# Patient Record
Sex: FEMALE | Race: WHITE | Employment: STUDENT | URBAN - METROPOLITAN AREA
[De-identification: names, ages, dates, MRNs, and addresses within clinical notes are randomized per-mention and may not be internally consistent; named-entity substitution may affect disease eponyms.]

---

## 2018-01-11 ENCOUNTER — OFFICE VISIT (OUTPATIENT)
Dept: OTOLARYNGOLOGY | Facility: CLINIC | Age: 18
End: 2018-01-11
Payer: COMMERCIAL

## 2018-01-11 VITALS — TEMPERATURE: 98.3 F | WEIGHT: 125 LBS | HEIGHT: 66 IN | BODY MASS INDEX: 20.09 KG/M2

## 2018-01-11 DIAGNOSIS — J01.90 ACUTE NON-RECURRENT SINUSITIS, UNSPECIFIED LOCATION: Primary | ICD-10-CM

## 2018-01-11 PROCEDURE — 99203 OFFICE O/P NEW LOW 30 MIN: CPT | Performed by: OTOLARYNGOLOGY

## 2018-01-11 NOTE — PATIENT INSTRUCTIONS
General Scheduling Information  To schedule your CT/MRI scan, please contact [facility] at [phone] OR [facility] at [phone]    To schedule your Surgery, please contact our Specialty Schedulers at [phone]      ENT Clinic Locations Clinic Hours Telephone Number     [facility]  [address] [facility] [facility]  [place] [address]     2nd & 4th Thursday:           8:00am - 12:00pm   To schedule/reschedule an appointment with   Dr. Green,   please contact our   Specialty Scheduling Department at:     [phone]       [facility] [place]  [address] [place] [address] [place] [address]   Monday:             8:00am -- 4:30 pm      1st, 3rd & 5th Thursday:           8:00am - 12:00pm      [facility] [address] [address]   Wednesday:       9:00 -- 4:30 pm

## 2018-01-11 NOTE — PROGRESS NOTES
ENT Consultation      History of Present Illness - Elizabeth Bingham is a 17 year old female with history of sinus infections.  Patient had a sinus infection 3 weeks ago. Currently experiencing a plugged right ear for about 1 week. Does have frequent sore throats, but no strep.    Does not usually have discomfort in the posterior of the throat.   Rarely has ear infections (maybe once before).    Past Medical History - No past medical history on file.    Current Medications -   Current Outpatient Prescriptions:      NO ACTIVE MEDICATIONS, , Disp: , Rfl:     Allergies - No Known Allergies    Social History -   Social History     Social History     Marital status: Single     Spouse name: N/A     Number of children: N/A     Years of education: N/A     Social History Main Topics     Smoking status: Not on file     Smokeless tobacco: Not on file     Alcohol use Not on file     Drug use: Not on file     Sexual activity: Not on file     Other Topics Concern     Not on file     Social History Narrative     No narrative on file       Family History - No family history on file.    Review of Systems - As per HPI and PMHx, otherwise review of system review of the head and neck negative.    This document serves as a record of the services and decisions personally performed and made by Kyle Green MD. It was created on his behalf by Kristopher Ruvalcaba, a trained medical scribe. The creation of this document is based the provider's statements to the medical scribe.  Kristopher Ruvalcaba January 11, 2018 2:53 PM     Physical Exam  There were no vitals taken for this visit.  BMI: There is no height or weight on file to calculate BMI.    General - The patient is well nourished and well developed, and appears to have good nutritional status.  Alert and oriented to person and place, answers questions and cooperates with examination appropriately.    SKIN - No suspicious lesions or rashes.  Respiration - No respiratory distress.  Head  and Face - Normocephalic and atraumatic, with no gross asymmetry noted of the contour of the facial features.  The facial nerve is intact, with strong symmetric movements.    Voice and Breathing - The patient was breathing comfortably without the use of accessory muscles. There was no wheezing, stridor, or stertor.  The patients voice was clear and strong, and had appropriate pitch and quality.    Ears - Bilateral pinna and EACs with normal appearing overlying skin. Tympanic membrane intact with good mobility on pneumatic otoscopy bilaterally. Bony landmarks of the ossicular chain are normal. The tympanic membranes are normal in appearance. No retraction, perforation, or masses.  No fluid or purulence was seen in the external canal or the middle ear. Clear on the left.  Serous fluid behind the eardrum on the right.     Eyes - Extraocular movements intact.  Sclera were not icteric or injected, conjunctiva were pink and moist.    Mouth - Examination of the oral cavity showed pink, healthy oral mucosa. No lesions or ulcerations noted.  The tongue was mobile and midline, and the dentition were in good condition.      Throat - The walls of the oropharynx were smooth, pink, moist, symmetric, and had no lesions or ulcerations.  The tonsillar pillars and soft palate were symmetric.  The uvula was midline on elevation. 2 + Tonsils, mild inflammation of the posterior pharynx     Neck - Normal midline excursion of the laryngotracheal complex during swallowing.  Full range of motion on passive movement.  Palpation of the occipital, submental, submandibular, internal jugular chain, and supraclavicular nodes did not demonstrate any abnormal lymph nodes or masses.  The carotid pulse was palpable bilaterally.  Palpation of the thyroid was soft and smooth, with no nodules or goiter appreciated.  The trachea was mobile and midline.    Nose - External contour is symmetric, no gross deflection or scars.  Nasal mucosa is pink and moist  with no abnormal mucus.  The septum was midline and non-obstructive, turbinates of normal size and position.  No polyps, masses, or purulence noted on examination. Slightly enlarged turbinates.     Neuro - Nonfocal neuro exam is normal, CN 2 through 12 intact, normal gait and muscle tone.    Performed in clinic today:  No procedures preformed in clinic today    A/P - Elizabeth Bingham is a 17 year old female with acute otitis media and rhinitis.    3 days of Afrin 2 sprays twice daily. Follow up with nasal saline.  Begin antibiotics Augmentin 875 for 10 days.    Patient will follow up with an ENT near her school in [facility].     Kyle Green MD    The information in this document, created by the medical scribe for me, accurately reflects the services I personally performed and the decisions made by me. I have reviewed and approved this document for accuracy prior to leaving the patient care area.  Kyle Green MD  2:53 PM, 01/11/18

## 2018-01-11 NOTE — LETTER
1/11/2018         RE: Elizabeth Bingham  [address] [facility] [address]        Dear Colleague,    Thank you for referring your patient, Elizabeth Bingham, to the [facility]. Please see a copy of my visit note below.    ENT Consultation      History of Present Illness - Elizabeth Bingham is a 17 year old female with history of sinus infections.  Patient had a sinus infection 3 weeks ago. Currently experiencing a plugged right ear for about 1 week. Does have frequent sore throats, but no strep.    Does not usually have discomfort in the posterior of the throat.   Rarely has ear infections (maybe once before).    Past Medical History - No past medical history on file.    Current Medications -   Current Outpatient Prescriptions:      NO ACTIVE MEDICATIONS, , Disp: , Rfl:     Allergies - No Known Allergies    Social History -   Social History     Social History     Marital status: Single     Spouse name: N/A     Number of children: N/A     Years of education: N/A     Social History Main Topics     Smoking status: Not on file     Smokeless tobacco: Not on file     Alcohol use Not on file     Drug use: Not on file     Sexual activity: Not on file     Other Topics Concern     Not on file     Social History Narrative     No narrative on file       Family History - No family history on file.    Review of Systems - As per HPI and PMHx, otherwise review of system review of the head and neck negative.    This document serves as a record of the services and decisions personally performed and made by Kyle Green MD. It was created on his behalf by Kristopher Ruvalcaba, a trained medical scribe. The creation of this document is based the provider's statements to the medical scribe.  Kristopher Ruvalcaba January 11, 2018 2:53 PM     Physical Exam  There were no vitals taken for this visit.  BMI: There is no height or weight on file to calculate BMI.    General - The patient is well nourished and  well developed, and appears to have good nutritional status.  Alert and oriented to person and place, answers questions and cooperates with examination appropriately.    SKIN - No suspicious lesions or rashes.  Respiration - No respiratory distress.  Head and Face - Normocephalic and atraumatic, with no gross asymmetry noted of the contour of the facial features.  The facial nerve is intact, with strong symmetric movements.    Voice and Breathing - The patient was breathing comfortably without the use of accessory muscles. There was no wheezing, stridor, or stertor.  The patients voice was clear and strong, and had appropriate pitch and quality.    Ears - Bilateral pinna and EACs with normal appearing overlying skin. Tympanic membrane intact with good mobility on pneumatic otoscopy bilaterally. Bony landmarks of the ossicular chain are normal. The tympanic membranes are normal in appearance. No retraction, perforation, or masses.  No fluid or purulence was seen in the external canal or the middle ear. Clear on the left.  Serous fluid behind the eardrum on the right.     Eyes - Extraocular movements intact.  Sclera were not icteric or injected, conjunctiva were pink and moist.    Mouth - Examination of the oral cavity showed pink, healthy oral mucosa. No lesions or ulcerations noted.  The tongue was mobile and midline, and the dentition were in good condition.      Throat - The walls of the oropharynx were smooth, pink, moist, symmetric, and had no lesions or ulcerations.  The tonsillar pillars and soft palate were symmetric.  The uvula was midline on elevation. 2 + Tonsils, mild inflammation of the posterior pharynx     Neck - Normal midline excursion of the laryngotracheal complex during swallowing.  Full range of motion on passive movement.  Palpation of the occipital, submental, submandibular, internal jugular chain, and supraclavicular nodes did not demonstrate any abnormal lymph nodes or masses.  The carotid  pulse was palpable bilaterally.  Palpation of the thyroid was soft and smooth, with no nodules or goiter appreciated.  The trachea was mobile and midline.    Nose - External contour is symmetric, no gross deflection or scars.  Nasal mucosa is pink and moist with no abnormal mucus.  The septum was midline and non-obstructive, turbinates of normal size and position.  No polyps, masses, or purulence noted on examination. Slightly enlarged turbinates.     Neuro - Nonfocal neuro exam is normal, CN 2 through 12 intact, normal gait and muscle tone.    Performed in clinic today:  No procedures preformed in clinic today    A/P - Elizabeth Bingham is a 17 year old female with acute otitis media and rhinitis.    3 days of Afrin 2 sprays twice daily. Follow up with nasal saline.  Begin antibiotics Augmentin 875 for 10 days.    Patient will follow up with an ENT near her school in [facility].     Kyle Green MD    The information in this document, created by the medical scribe for me, accurately reflects the services I personally performed and the decisions made by me. I have reviewed and approved this document for accuracy prior to leaving the patient care area.  Kyle Green MD  2:53 PM, 01/11/18      Again, thank you for allowing me to participate in the care of your patient.        Sincerely,        Kyle Green MD, MD

## 2020-10-19 ENCOUNTER — OFFICE VISIT (OUTPATIENT)
Dept: OPHTHALMOLOGY | Facility: CLINIC | Age: 20
End: 2020-10-19
Payer: COMMERCIAL

## 2020-10-19 DIAGNOSIS — H52.13 MYOPIA OF BOTH EYES: Primary | ICD-10-CM

## 2020-10-19 PROCEDURE — 92004 COMPRE OPH EXAM NEW PT 1/>: CPT | Performed by: OPHTHALMOLOGY

## 2020-10-19 RX ORDER — METRONIDAZOLE 7.5 MG/G
LOTION TOPICAL
COMMUNITY
Start: 2020-09-03

## 2020-10-19 ASSESSMENT — REFRACTION_WEARINGRX
OS_SPHERE: -1.25
OD_CYLINDER: +0.50
OS_AXIS: 081
OD_SPHERE: -0.75
OD_AXIS: 081
OS_CYLINDER: +0.75
SPECS_TYPE: SVL

## 2020-10-19 ASSESSMENT — CONF VISUAL FIELD
OS_NORMAL: 1
METHOD: COUNTING FINGERS
OD_NORMAL: 1

## 2020-10-19 ASSESSMENT — VISUAL ACUITY
METHOD: SNELLEN - LINEAR
CORRECTION_TYPE: GLASSES
OS_CC: 20/20
OD_CC: 20/20

## 2020-10-19 ASSESSMENT — EXTERNAL EXAM - RIGHT EYE: OD_EXAM: NORMAL

## 2020-10-19 ASSESSMENT — REFRACTION_MANIFEST
OS_CYLINDER: +1.00
OS_AXIS: 116
OD_AXIS: 070
OD_CYLINDER: +0.50
OS_SPHERE: -1.00
OD_SPHERE: -0.75

## 2020-10-19 ASSESSMENT — TONOMETRY
OD_IOP_MMHG: 16
OS_IOP_MMHG: 17
IOP_METHOD: ICARE

## 2020-10-19 ASSESSMENT — EXTERNAL EXAM - LEFT EYE: OS_EXAM: NORMAL

## 2020-10-19 ASSESSMENT — SLIT LAMP EXAM - LIDS
COMMENTS: NORMAL
COMMENTS: NORMAL

## 2020-10-19 NOTE — NURSING NOTE
Chief Complaints and History of Present Illnesses   Patient presents with     Annual Eye Exam      Chief Complaint(s) and History of Present Illness(es)     Annual Eye Exam     Laterality: both eyes    Onset: gradual    Quality: blurred vision    Context: distance vision    Associated symptoms: Negative for dryness, eye pain, redness and tearing              Comments     Annual eye exam  Current glasses cause blurred distance vision.  Denies tearing, redness, eye pain.  Eye meds: none  GEORGETTE Atkins 10/19/2020 3:11 PM

## 2020-10-28 ENCOUNTER — VIRTUAL VISIT (OUTPATIENT)
Dept: PSYCHOLOGY | Facility: CLINIC | Age: 20
End: 2020-10-28
Payer: COMMERCIAL

## 2020-10-28 DIAGNOSIS — F41.1 GENERALIZED ANXIETY DISORDER: Primary | ICD-10-CM

## 2020-10-28 PROCEDURE — 90834 PSYTX W PT 45 MINUTES: CPT | Mod: 95 | Performed by: COUNSELOR

## 2020-10-28 ASSESSMENT — PATIENT HEALTH QUESTIONNAIRE - PHQ9
5. POOR APPETITE OR OVEREATING: NOT AT ALL
SUM OF ALL RESPONSES TO PHQ QUESTIONS 1-9: 3

## 2020-10-28 ASSESSMENT — ANXIETY QUESTIONNAIRES
2. NOT BEING ABLE TO STOP OR CONTROL WORRYING: SEVERAL DAYS
7. FEELING AFRAID AS IF SOMETHING AWFUL MIGHT HAPPEN: NOT AT ALL
GAD7 TOTAL SCORE: 3
3. WORRYING TOO MUCH ABOUT DIFFERENT THINGS: SEVERAL DAYS
IF YOU CHECKED OFF ANY PROBLEMS ON THIS QUESTIONNAIRE, HOW DIFFICULT HAVE THESE PROBLEMS MADE IT FOR YOU TO DO YOUR WORK, TAKE CARE OF THINGS AT HOME, OR GET ALONG WITH OTHER PEOPLE: SOMEWHAT DIFFICULT
1. FEELING NERVOUS, ANXIOUS, OR ON EDGE: SEVERAL DAYS
6. BECOMING EASILY ANNOYED OR IRRITABLE: NOT AT ALL
5. BEING SO RESTLESS THAT IT IS HARD TO SIT STILL: NOT AT ALL

## 2020-10-28 ASSESSMENT — COLUMBIA-SUICIDE SEVERITY RATING SCALE - C-SSRS
2. HAVE YOU ACTUALLY HAD ANY THOUGHTS OF KILLING YOURSELF LIFETIME?: NO
1. IN THE PAST MONTH, HAVE YOU WISHED YOU WERE DEAD OR WISHED YOU COULD GO TO SLEEP AND NOT WAKE UP?: NO
3. HAVE YOU BEEN THINKING ABOUT HOW YOU MIGHT KILL YOURSELF?: NO
1. IN THE PAST MONTH, HAVE YOU WISHED YOU WERE DEAD OR WISHED YOU COULD GO TO SLEEP AND NOT WAKE UP?: NO
5. HAVE YOU STARTED TO WORK OUT OR WORKED OUT THE DETAILS OF HOW TO KILL YOURSELF? DO YOU INTEND TO CARRY OUT THIS PLAN?: NO
4. HAVE YOU HAD THESE THOUGHTS AND HAD SOME INTENTION OF ACTING ON THEM?: NO
2. HAVE YOU ACTUALLY HAD ANY THOUGHTS OF KILLING YOURSELF?: NO
5. HAVE YOU STARTED TO WORK OUT OR WORKED OUT THE DETAILS OF HOW TO KILL YOURSELF? DO YOU INTEND TO CARRY OUT THIS PLAN?: NO
4. HAVE YOU HAD THESE THOUGHTS AND HAD SOME INTENTION OF ACTING ON THEM?: NO

## 2020-10-28 NOTE — PROGRESS NOTES
"                                           Progress Note    Patient Name: Elizabeth Bingham  Date: 10/28/2020         Service Type: Individual      Session Start Time: 1030  Session End Time: 1122     Session Length: 52    Session #: 1    Attendees: Client attended alone    Service Modality:  Phone Visit:    The patient has been notified of the following:      \"We have found that certain health care needs can be provided without the need for a face to face visit.  This service lets us provide the care you need with a phone conversation.       I will have full access to your [facility] medical record during this entire phone call.   I will be taking notes for your medical record.      Since this is like an office visit, we will bill your insurance company for this service.       There are potential benefits and risks of telephone visits (e.g. limits to patient confidentiality) that differ from in-person visits.?  Confidentiality still applies for telephone services, and nobody will record the visit.  It is important to be in a quiet, private space that is free of distractions (including cell phone or other devices) during the visit.??      If during the course of the call I believe a telephone visit is not appropriate, you will not be charged for this service\"     Consent has been obtained for this service by care team member: Yes      Treatment Plan Last Reviewed: NA  PHQ-9 / DANIEL-7 : yes  PHQ 10/28/2020   PHQ-9 Total Score 3   Q9: Thoughts of better off dead/self-harm past 2 weeks Not at all     DANIEL-7 SCORE 10/28/2020   Total Score 3           DATA  Interactive Complexity: No  Crisis: No       Progress Since Last Session (Related to Symptoms / Goals / Homework):   Symptoms: No change first session She reported changes in sleep, fatigue, difficultly concentrating, feeling anxious and nervous, uncontrollable worry, and racing thoughts.    Homework: NA      Episode of Care Goals: Minimal progress - PREPARATION " "(Decided to change - considering how); Intervened by negotiating a change plan and determining options / strategies for behavior change, identifying triggers, exploring social supports, and working towards setting a date to begin behavior change     Current / Ongoing Stressors and Concerns:   Current-graduate school, family relationships, anxiety, \"vaping\"    Writer and patient completed the intake assessment forms and reviewed expectations and informed consent. Writer and patient started gathering background information for the diagnostic assessment. She indicate that her symptoms of anxiety led her to seek mental health services. She denied any suicidal ideation and SIB.    Ongoing-graduate school, family, anxiety, \"vaping\"     Treatment Objective(s) Addressed in This Session:   Build rapport     Intervention:   Motivational Interviewing    MI Intervention: Expressed Empathy/Understanding, Supported Autonomy, Collaboration, Evocation and Open-ended questions     Change Talk Expressed by the Patient: Desire to change    Provider Response to Change Talk: E - Evoked more info from patient about behavior change, A - Affirmed patient's thoughts, decisions, or attempts at behavior change, R - Reflected patient's change talk and S - Summarized patient's change talk statements          ASSESSMENT: Current Emotional / Mental Status (status of significant symptoms):   Risk status (Self / Other harm or suicidal ideation)   Patient denies current fears or concerns for personal safety.   Patient denies current or recent suicidal ideation or behaviors.   Patient denies current or recent homicidal ideation or behaviors.   Patient denies current or recent self injurious behavior or ideation.   Patient denies other safety concerns.   Patient reports there has been no change in risk factors since their last session.     Patient reports there has been no change in protective factors since their last session.     Recommended that " "patient call 911 or go to the local ED should there be a change in any of these risk factors.     Appearance:   unable to assess due to telephone visit    Eye Contact:   unable to assess    Psychomotor Behavior: unable to assess    Attitude:   Cooperative  Interested Friendly   Orientation:   All   Speech    Rate / Production: Normal     Volume:  Normal    Mood:    Anxious    Affect:    unable to assess    Thought Content:  Clear    Thought Form:  Coherent  Logical    Insight:    Fair      Medication Review:   No current psychiatric medications prescribed     Medication Compliance:   NA     Changes in Health Issues:   None reported     Chemical Use Review:   Substance Use: Chemical use reviewed, no active concerns identified      Tobacco Use: Patient \"vapes\" and reported that she would like to address her concerns during treatment.    Diagnosis:  1. Generalized anxiety disorder        Collateral Reports Completed:   Not Applicable    PLAN: (Patient Tasks / Therapist Tasks / Other)  Patient will attend next session. She will review intake packet.        MARNIE Loving                                                           "

## 2020-10-29 ASSESSMENT — ANXIETY QUESTIONNAIRES: GAD7 TOTAL SCORE: 3

## 2020-11-10 ENCOUNTER — VIRTUAL VISIT (OUTPATIENT)
Dept: PSYCHOLOGY | Facility: CLINIC | Age: 20
End: 2020-11-10
Payer: COMMERCIAL

## 2020-11-10 DIAGNOSIS — F41.1 GENERALIZED ANXIETY DISORDER: Primary | ICD-10-CM

## 2020-11-10 PROCEDURE — 90791 PSYCH DIAGNOSTIC EVALUATION: CPT | Mod: 95 | Performed by: COUNSELOR

## 2020-11-10 NOTE — PROGRESS NOTES
"  [facility]  Evaluator Name:  Angie Beach MA [facility]      PATIENT'S NAME: Elizabeth Bingham  PREFERRED NAME: Darcy  PRONOUNS:  her/her     MRN:   [id number]  :   2000   ACCT. NUMBER: [id number]  DATE OF SERVICE: 11/10/20  START TIME: 100  END TIME: 140  PREFERRED PHONE: [phone]  May we leave a program related message: Yes  SERVICE MODALITY:  Phone Visit:    The patient has been notified of the following:      \"We have found that certain health care needs can be provided without the need for a face to face visit.  This service lets us provide the care you need with a phone conversation.       I will have full access to your [facility] medical record during this entire phone call.   I will be taking notes for your medical record.      Since this is like an office visit, we will bill your insurance company for this service.       There are potential benefits and risks of telephone visits (e.g. limits to patient confidentiality) that differ from in-person visits.?  Confidentiality still applies for telephone services, and nobody will record the visit.  It is important to be in a quiet, private space that is free of distractions (including cell phone or other devices) during the visit.??      If during the course of the call I believe a telephone visit is not appropriate, you will not be charged for this service\"     Consent has been obtained for this service by care team member: Yes     UNIVERSAL ADULT DIAGNOSTIC ASSESSMENT  This assessment was completed over two therapy sessions.     Identifying Information:  Patient is a 20 year old, .  The pronoun use throughout this assessment reflects the patient's chosen pronoun.  Patient was referred for an assessment by self.  Patient attended the session alone.     Chief Complaint:   The reason for seeking services at this time is: \" I am just trying it out and getting another perspective and talking with someone about my anxiety and tools that " "could help me with that \"   The problem(s) began 5 years ago when she was in highschool. Patient has not attempted to resolve these concerns in the past.    Social/Family History:  Patient reported they grew up in [place], [place].  They were raised by biological parents. Her parents immigrated to the states 30 years ago from [place]. Parents stayed ..   Patient reported that their childhood was \"fulled and super busy , and started gymnastics at a young age and quit at 12 years old, spent time with godparents' children, and would travel for gymnastics, take trips, had a great chilhood.\"  Patient described their current relationships with family of origin as \"really good, super close with them and value family, my mom and brother are like my best friends.\"      The patient describes their cultural background as a combination of Cymro and American.  Cultural influences and impact on patient's life structure, values, norms, and healthcare: Racial or Ethnic Self-Identification celebrate different holidays, value family and relationships,eating different food.  Contextual influences on patient's health include: Family Factors brother and parental conflict about potentially moving out.    These factors will be addressed in the Preliminary Treatment plan.  Patient identified their preferred language to be English. Patient reported they does not need the assistance of an  or other support involved in therapy.     Patient reported had no significant delays in developmental tasks.   Patient's highest education level was college graduate.She reported that she is currently in graduate school at [employer] in [place]. Patient identified the following learning problems: none reported.  Modifications will not be used to assist communication in therapy.   Patient reports they are  able to understand written materials.   Patient reported the following relationship history \"never had a boyfriend, there were " opportunities and I probably didn't want to go through that, and I want to focus on myself and graduate school.  Patient's current relationship status is single for most of her life.   Patient identified their sexual orientation as heterosexual.  Patient reported having zero child(aleyda). Patient identified mother, siblings and friends as part of their support system.  Patient identified the quality of these relationships as stable and meaningful.      Patient's current living/housing situation involves staying with her parents .  They live with parents and brother and they report that housing is stable.     Patient is currently a student and reports they are able to function appropriately at school. and works part-time at parents cleaning company.  Patient reports their finances are obtained through parents.  Patient does not identify finances as a current stressor.      Patient reported that they have not been involved with the legal system.   Patient denies being on probation / parole / under the jurisdiction of the court.    Patient's Strengths and Limitations:  Patient identified the following strengths or resources that will help them succeed in treatment: commitment to health and well being, friends / good social support, family support, insight, intelligence, positive school connection, motivation, sense of humor and strong social skills. Things that may interfere with the patient's success in treatment include: none identified.     Personal and Family Medical History:   Patient does report a family history of mental health concerns. She reported that mother was diagnosed with mild depression and brother was diagnosed with anxiety Patient reports family history includes Hypertension in her father..     Patient does not report Mental Health Diagnosis or Treatment.      Patient has had a physical exam to rule out medical causes for current symptoms.  Date of last physical exam was within the past year. Client  was encouraged to follow up with PCP if symptoms were to develop. The patient has an [facility] Primary Care Provider, who is named Dr. Pal. Patient reports no current medical concerns.  There are not significant appetite / nutritional concerns / weight changes.   Patient does not report a history of head injury / trauma / cognitive impairment.      Patient reports not taking any current medications    Medication Adherence:  Patient reports NA.    Patient Allergies:  No Known Allergies    Medical History:  No past medical history on file.      Current Mental Status Exam:   Appearance:  unable to assess    Eye Contact:  unable to assess due to telephone visit   Psychomotor:  unable to assess due to telephone visit       Gait / station:  Unable to assess    Attitude / Demeanor: Cooperative  Interested Friendly  Speech      Rate / Production: Normal/ Responsive      Volume:  Loud  volume      Language:  intact  Mood:   Anxious   Affect:   unable to assess    Thought Content: Clear   Thought Process: Coherent  Logical       Associations: No loosening of associations  Insight:   Fair   Judgment:  Intact   Orientation:  All  Attention/concentration: Fair    Rating Scales:    PHQ9:    PHQ-9 SCORE 10/28/2020   PHQ-9 Total Score 3   ;    GAD7:    DANIEL-7 SCORE 10/28/2020   Total Score 3     CGI:     First:Considering your total clinical experience with this particular patient population, how severe are the patient's symptoms at this time?: 3 (11/10/2020  3:16 PM)  ;    Most recentCompared to the patient's condition at the START of treatment, this patient's condition is: 2 (11/10/2020  3:16 PM)      Substance Use:  Patient did not report a family history of substance use concerns; see medical history section for details.  Patient has not received chemical dependency treatment in the past.  Patient has not ever been to detox.      Patient is not currently receiving any chemical dependency treatment. Patient reported the  "following problems as a result of their substance use: NA.    Patient reports using alcohol 2 times per week and has 5 beers at a time. Patient first started drinking at age 18.  Patient reported date of last use was 10/27/2020.  Patient reports heaviest use was about 4 years ago in college.  She reported that she \"vapes,\" which is a nicotine device, and uses it over 10 times daily.  She noted that she started \"vaping\" in college.    Patient reports using marijuana 7 times per week and smokes 1 at a time. Patient started using marijuana at age 19.  Patient reports last use was 10/27/2020.  Patient reports heaviest use was 4 years ago.  Patient reports using caffeine 5 times per week and drinks 2 at a time. Patient started using caffeine at age 15.  Patient reports using/abusing the following substance(s). Patient reported no other substance use.     CAGE- AID:    CAGE-AID Total Score 10/28/2020   Total Score 3       Substance Use: daily use    Based on the positive CAGE score and clinical interview there  are indications of drug or alcohol abuse. Diagnostic assessment for substance use disorder completed. Therapist did recommend client to reduce use or abstain from alcohol or substance use. Therapist did recommend structured treatment and or community support (AA, 12 step group, etc.). She wants to work on reducing substance use. She reported that she was not interested in structured treatment or community support. She does not believe that her substance use is to that level of impairment and severity at this time.     Significant Losses / Trauma / Abuse / Neglect Issues:   Patient did not serve in the .  There are indications or report of significant loss, trauma, abuse or neglect issues related to: are no indications and client denies any losses, trauma, abuse, or neglect concerns.  Concerns for possible neglect are not present.     Safety Assessment:   Current Safety Concerns:  [place] Suicide Severity " Rating Scale (Lifetime/Recent)  [place] Suicide Severity Rating (Lifetime/Recent) 10/28/2020   1. Wish to be Dead (Lifetime) No   1. Wish to be Dead (Recent) No   2. Non-Specific Active Suicidal Thoughts (Lifetime) No   2. Non-Specific Active Suicidal Thoughts (Recent) No   3. Active Suicidal Ideation with any Methods (Not Plan) Without Intent to Act (Lifetime) No   3. Active Sucidal Ideation with any Methods (Not Plan) Without Intent to Act (Recent) No   4. Active Suicidal Ideation with Some Intent to Act, Without Specific Plan (Lifetime) No   4. Active Suicidal Ideation with Some Intent to Act, Without Specific Plan (Recent) No   5. Active Suicidal Ideation with Specific Plan and Intent (Lifetime) No   5. Active Suicidal Ideation with Specific Plan and Intent (Recent) No   Has subject engaged in non-suicidal self-injurious behavior? (Lifetime) No   Has subject engaged in non-suicidal self-injurious behavior? (Past 3 Months) No     Patient denies current homicidal ideation and behaviors.  Patient denies current self-injurious ideation and behaviors.    Patient denied risk behaviors associated with substance use.  Patient denies any high risk behaviors associated with mental health symptoms.  Patient reports the following current concerns for their personal safety: None.  Patient reports there are not  firearms in the house. NA.     History of Safety Concerns:  Patient denied a history of homicidal ideation.     Patient denied a history of personal safety concerns.    Patient denied a history of assaultive behaviors.    Patient denied a history of sexual assault behaviors.     Patient denied a history of risk behaviors associated with substance use.  Patient denies any history of high risk behaviors associated with mental health symptoms.  Patient reports the following protective factors: positive relationships positive social network and positive family connections, forward/future oriented thinking, dedication to  family/friends, safe and stable environment, living with other people, daily obligations, structured day, effective problem-solving skills and committment to well-being    Risk Plan:  See Recommendations for Safety and Risk Management Plan    Review of Symptoms per patient report:  Depression: Change in energy level, Difficulties concentrating, Change in appetite and Feeling sad, down, or depressed  Na:  No Symptoms  Psychosis: No Symptoms  Anxiety: Excessive worry, Nervousness and Poor concentration  Panic:  No symptoms  Post Traumatic Stress Disorder:  No Symptoms   Eating Disorder: No Symptoms  ADD / ADHD:  Difficulties listening, Poor organizational skills, Distractibility, Forgetful, Interrupts, Impulsive and Hyperactive  Conduct Disorder: No symptoms  Autism Spectrum Disorder: No symptoms  Obsessive Compulsive Disorder: No Symptoms    Patient reports the following compulsive behaviors and treatment history: NA.      Diagnostic Criteria:   A. Excessive anxiety and worry about a number of events or activities (such as work or school performance).   B. The person finds it difficult to control the worry.   - Being easily fatigued.    - Difficulty concentrating or mind going blank.    - Sleep disturbance (difficulty falling or staying asleep, or restless unsatisfying sleep).   D. The focus of the anxiety and worry is not confined to features of an Axis I disorder.  E. The anxiety, worry, or physical symptoms cause clinically significant distress or impairment in social, occupational, or other important areas of functioning.   F. The disturbance is not due to the direct physiological effects of a substance (e.g., a drug of abuse, a medication) or a general medical condition (e.g., hyperthyroidism) and does not occur exclusively during a Mood Disorder, a Psychotic Disorder, or a Pervasive Developmental Disorder.    - The aformentioned symptoms began 5 year(s) ago and occurs 5 days per week and is experienced as  moderate.  OP BEH SANDRA CRITERIA: There is persistent desire or unsuccessful efforts to cut down or control use of the substance.  Met for:  Cannabis    Functional Status:  Patient reports the following functional impairments: academic performance, management of the household and or completion of tasks, money management, organization, relationship(s) and self-care.     WHODAS:   WHODAS 2.0 Total Score 10/28/2020   Total Score 14       Clinical Summary:  1. Reason for assessment:increased anxiety self referred .  2. Psychosocial, Cultural and Contextual Factors: COVID, [place] American, graduate school.  3. Principal DSM5 Diagnoses  (Sustained by DSM5 Criteria Listed Above):   300.02 (F41.1) Generalized Anxiety Disorder.  4. Other Diagnoses that is relevant to services:  None at this time  5. Provisional Diagnosis:  Substance-Related & Addictive Disorders 304.30 (F12.20) Cannabis Use Disorder Moderate  . as evidenced by her reported substance use.  6. Prognosis: Expect Improvement.  7. Likely consequences of symptoms if not treated: increased symptoms of anxiety and substance use.  8. Client strengths include:  caring, creative, educated, empathetic, insightful, intelligent, motivated, support of family, friends and providers and supportive .     Recommendations:     1. Plan for Safety and Risk Management:   Recommended that patient call 911 or go to the local ED should there be a change in any of these risk factors..          Report to child / adult protection services was NA.     2. Patient denied any cultural concerns related to her treatment.     3. Initial Treatment will focus on:    Anxiety - reduce symptoms and build coping  Alcohol / Substance Use - reduce her use of substance and build coping skills.     4. Resources/Service Plan:    services are not indicated.   Modifications to assist communication are not indicated.   Additional disability accommodations are not indicated.      5.  Collaboration:   Collaboration / coordination of treatment will be initiated with the following  support professionals: primary care physician. Patient indicated that she will be participating in an ADHD assessment at an [facility].      6.  Referrals:   The following referral(s) will be initiated: Outpatient Mental [facility]. Next Scheduled Appointment: 11/23/2020.     A Release of Information has been obtained for the following: Emergency Contact-her mother.    7. SANDRA:    SANDRA:  Discussed the general effects of drugs and alcohol on health and well-being. Provider gave patient printed information about the effects of chemical use on their health and well being. Recommendations:  Patient will work on reducing her substance use, and she will use outpatient therapy to address substance use concerns. Therapist will continue to monitor and assess her substance use and will make recommendation accordingly.      8. Records:    were reviewed at time of assessment.   Information in this assessment was obtained from the medical record and  provided by patient who is a good historian.    Patient will have open access to their mental health medical record.      Eval type:  Mental Health    Provider Name/ Credentials:  Angie Beach MA [facility]    October 28, 2020

## 2020-11-10 NOTE — PROGRESS NOTES
Treatment Plan    Client's Name: Elizabeth Bingham  YOB: 2000    Date: 11/10/2020    DSM-V Diagnoses: 300.02 (F41.1) Generalized Anxiety Disorder  Psychosocial / Contextual Factors: COVID  WHODAS:   WHODAS 2.0 Total Score 10/28/2020   Total Score 14       Referral / Collaboration:  Referral to another professional/service is not indicated at this time..    Anticipated number of session or this episode of care: 30      MeasurableTreatment Goal(s) related to diagnosis / functional impairment(s)  Goal 1: Client will reduce her anxiety as evidenced by a reduction 2 points on the DANIEL-7.     I will know I've met my goal when my bank account looks better, not walking around everywhere with a vape in hand, not biting my lip.      Objective #A (Client Action)    Client will build insight and awareness to substance use triggers and urges.  Status: New - Date: 11/10/2020     Intervention(s)  Therapist will teach about the cycle of addiction, help identify triggers and urges, assist with problem solving barriers to reduce use, explore     Objective #B  Client will learn and practice coping skills to reduce anxiety daily.  Status: New - Date: 11/10/2020     Intervention(s)  Therapist will provide psycho-education on distress tolerance skills and mindfulness, assist with problem solving barriers to effective skill use, and offer skills coaching and skill identification.  .    Objective #C  Client will process and explore symptoms of anxiety for more effective intervention.  Status: New - Date: 11/10/2020     Intervention(s)  Therapist will provide psycho-education on the cycle of anxiety, assist with emotion identification, offer empathy and validation, and create a safe and supportive environment.              Patient has reviewed and agreed to the above plan.      Angie Beach, [facility]  November 10, 2020

## 2020-11-23 ENCOUNTER — VIRTUAL VISIT (OUTPATIENT)
Dept: PSYCHOLOGY | Facility: CLINIC | Age: 20
End: 2020-11-23
Payer: COMMERCIAL

## 2020-11-23 DIAGNOSIS — F41.1 GENERALIZED ANXIETY DISORDER: Primary | ICD-10-CM

## 2020-11-23 PROCEDURE — 90834 PSYTX W PT 45 MINUTES: CPT | Mod: 95 | Performed by: COUNSELOR

## 2020-11-23 NOTE — PROGRESS NOTES
"                                           Progress Note    Patient Name: Elizabeth Bingham  Date: 10/28/2020         Service Type: Individual      Session Start Time: 1100  Session End Time: 1150     Session Length: 50 minutes    Session #: 3    Attendees: Client attended alone    Service Modality:  Phone Visit:    The patient has been notified of the following:      \"We have found that certain health care needs can be provided without the need for a face to face visit.  This service lets us provide the care you need with a phone conversation.       I will have full access to your [place] medical record during this entire phone call.   I will be taking notes for your medical record.      Since this is like an office visit, we will bill your insurance company for this service.       There are potential benefits and risks of telephone visits (e.g. limits to patient confidentiality) that differ from in-person visits.?  Confidentiality still applies for telephone services, and nobody will record the visit.  It is important to be in a quiet, private space that is free of distractions (including cell phone or other devices) during the visit.??      If during the course of the call I believe a telephone visit is not appropriate, you will not be charged for this service\"     Consent has been obtained for this service by care team member: Yes      Treatment Plan Last Reviewed: 11/10/2020  PHQ-9 / DANIEL-7 : 3,3        DATA  Interactive Complexity: No  Crisis: No       Progress Since Last Session (Related to Symptoms / Goals / Homework):   Symptoms: Improving since last session She reported changes in sleep, fatigue, difficultly concentrating, feeling anxious and nervous, uncontrollable worry, and racing thoughts.    Homework: Partially completed      Episode of Care Goals: Minimal progress - PREPARATION (Decided to change - considering how); Intervened by negotiating a change plan and determining options / strategies for " "behavior change, identifying triggers, exploring social supports, and working towards setting a date to begin behavior change     Current / Ongoing Stressors and Concerns:   Current-graduate school, family relationships, anxiety, \"vaping\"    Patient informed writer that she has not used her vape/nicotine for 5 days. She reported that she feeling more physically healthy and has been going on walks, runs, and bikes rides. She noted that the her calendar she is using to track her days free of nicotine and her family keeping her accountable has been helpful. Writer assisted patient with identifying coping skills she can use when she is triggered or stressed. Writer also provided encouragement and praise for her progress. Patient indicated that she would like to spend time in future sessions processing her role in the family as a \"\" between her brother and parents. She denied any suicidal ideation and SIB.    Ongoing-graduate school, family, anxiety, \"vaping\"     Treatment Objective(s) Addressed in This Session:   Client will build insight and awareness to substance use triggers and urges.     Intervention:   Motivational Interviewing    MI Intervention: Expressed Empathy/Understanding, Supported Autonomy, Collaboration, Evocation and Open-ended questions     Change Talk Expressed by the Patient: Desire to change    Provider Response to Change Talk: E - Evoked more info from patient about behavior change, A - Affirmed patient's thoughts, decisions, or attempts at behavior change, R - Reflected patient's change talk and S - Summarized patient's change talk statements     DBT: psycho-education distress tolerance skills, mindfulness skills   CBT: challenging cognitive distortion        ASSESSMENT: Current Emotional / Mental Status (status of significant symptoms):   Risk status (Self / Other harm or suicidal ideation)   Patient denies current fears or concerns for personal safety.   Patient denies current or recent " "suicidal ideation or behaviors.   Patient denies current or recent homicidal ideation or behaviors.   Patient denies current or recent self injurious behavior or ideation.   Patient denies other safety concerns.   Patient reports there has been no change in risk factors since their last session.     Patient reports there has been no change in protective factors since their last session.     Recommended that patient call 911 or go to the local ED should there be a change in any of these risk factors.     Appearance:   unable to assess due to telephone visit    Eye Contact:   unable to assess    Psychomotor Behavior: unable to assess    Attitude:   Cooperative  Interested Friendly   Orientation:   All   Speech    Rate / Production: Normal     Volume:  Normal    Mood:    Normal   Affect:    unable to assess    Thought Content:  Clear    Thought Form:  Coherent  Logical    Insight:    Fair      Medication Review:   No current psychiatric medications prescribed     Medication Compliance:   NA     Changes in Health Issues:   None reported     Chemical Use Review:   Substance Use: Chemical use reviewed, no active concerns identified      Tobacco Use: Patient \"vapes\" and reported that she would like to address her concerns during treatment.    Diagnosis:  1. Generalized anxiety disorder        Collateral Reports Completed:   Not Applicable    PLAN: (Patient Tasks / Therapist Tasks / Other)  Patient will abstain from using nicotine and practice breathing exercises daily for 10 minutes.        Angie Beach [facility]                                                                                                      Treatment Plan    Client's Name: Elizabeth Bingham  YOB: 2000    Date: 11/10/2020    DSM-V Diagnoses: 300.02 (F41.1) Generalized Anxiety Disorder  Psychosocial / Contextual Factors: COVID  WHODAS:   WHODAS 2.0 Total Score 10/28/2020   Total Score 14       Referral / Collaboration:  Referral to another " professional/service is not indicated at this time..    Anticipated number of session or this episode of care: 30      MeasurableTreatment Goal(s) related to diagnosis / functional impairment(s)  Goal 1: Client will reduce her anxiety as evidenced by a reduction 2 points on the DANIEL-7.     I will know I've met my goal when my bank account looks better, not walking around everywhere with a vape in hand, not biting my lip.      Objective #A (Client Action)    Client will build insight and awareness to substance use triggers and urges.  Status: New - Date: 11/10/2020     Intervention(s)  Therapist will teach about the cycle of addiction, help identify triggers and urges, assist with problem solving barriers to reduce use, explore     Objective #B  Client will learn and practice coping skills to reduce anxiety daily.  Status: New - Date: 11/10/2020     Intervention(s)  Therapist will provide psycho-education on distress tolerance skills and mindfulness, assist with problem solving barriers to effective skill use, and offer skills coaching and skill identification.  .    Objective #C  Client will process and explore symptoms of anxiety for more effective intervention.  Status: New - Date: 11/10/2020     Intervention(s)  Therapist will provide psycho-education on the cycle of anxiety, assist with emotion identification, offer empathy and validation, and create a safe and supportive environment.              Patient has reviewed and agreed to the above plan.      Angie Beach, [facility]  November 10, 2020

## 2020-11-24 NOTE — PROGRESS NOTES
Assessment & Plan   Elizabeth Bingham is a 20 year old female who presents with:   Review of systems for the eyes was negative other than the pertinent positives and negatives noted in the HPI.    Myopia of both eyes  - Rx per MR (glasses)    Return in 12 months for annual exam.      Attending Physician Attestation:  Complete documentation of historical and exam elements from today's encounter can be found in the full encounter summary report (not reduplicated in this progress note).  I personally obtained the chief complaint(s) and history of present illness.  I confirmed and edited as necessary the review of systems, past medical/surgical history, family history, social history, and examination findings as documented by others; and I examined the patient myself.  I personally reviewed the relevant tests, images, and reports as documented above.  I formulated and edited as necessary the assessment and plan and discussed the findings and management plan with the patient and family. - Fredy Ramirez MD

## 2020-11-30 ENCOUNTER — VIRTUAL VISIT (OUTPATIENT)
Dept: PSYCHOLOGY | Facility: CLINIC | Age: 20
End: 2020-11-30
Payer: COMMERCIAL

## 2020-11-30 DIAGNOSIS — F41.1 GENERALIZED ANXIETY DISORDER: Primary | ICD-10-CM

## 2020-11-30 PROCEDURE — 90834 PSYTX W PT 45 MINUTES: CPT | Mod: 95 | Performed by: COUNSELOR

## 2020-11-30 NOTE — PROGRESS NOTES
"                                           Progress Note    Patient Name: Elizabeth Bingham  Date: 11/30/2020         Service Type: Individual      Session Start Time: 1100  Session End Time: 1144     Session Length: 44 minutes    Session #: 4    Attendees: Client attended alone    Service Modality:  Phone Visit:    The patient has been notified of the following:      \"We have found that certain health care needs can be provided without the need for a face to face visit.  This service lets us provide the care you need with a phone conversation.       I will have full access to your [place] medical record during this entire phone call.   I will be taking notes for your medical record.      Since this is like an office visit, we will bill your insurance company for this service.       There are potential benefits and risks of telephone visits (e.g. limits to patient confidentiality) that differ from in-person visits.?  Confidentiality still applies for telephone services, and nobody will record the visit.  It is important to be in a quiet, private space that is free of distractions (including cell phone or other devices) during the visit.??      If during the course of the call I believe a telephone visit is not appropriate, you will not be charged for this service\"     Consent has been obtained for this service by care team member: Yes      Treatment Plan Last Reviewed: 11/10/2020  PHQ-9 / DANIEL-7 : 3,3        DATA  Interactive Complexity: No  Crisis: No       Progress Since Last Session (Related to Symptoms / Goals / Homework):   Symptoms: Improving since last session She reported changes in sleep, fatigue, difficultly concentrating, feeling anxious and nervous, uncontrollable worry, and racing thoughts.    Homework: Partially completed      Episode of Care Goals: Minimal progress - PREPARATION (Decided to change - considering how); Intervened by negotiating a change plan and determining options / strategies for " "behavior change, identifying triggers, exploring social supports, and working towards setting a date to begin behavior change     Current / Ongoing Stressors and Concerns:   Current-graduate school, family relationships, anxiety, \"vaping\"    Patient informed writer that she used nicotine twice last week. She processed her feelings about her use. Writer had patient completed a verbal behavior chain analysis to help build insight. She noted feeling disappointed and she disposed of her nicotine and that she was feeling stressed out due to finals for graduate school. Writer assisted patient with brainstorming ways to use distraction and coping skills to manage her urges. She was receptive to feedback and support. She noted that she has an ADD/ADHD assessment tomorrow at [facility]. She denied any suicidal ideation and SIB.    Ongoing-graduate school, family, anxiety, \"vaping\"     Treatment Objective(s) Addressed in This Session:   Client will build insight and awareness to substance use triggers and urges.     Intervention:   Motivational Interviewing    MI Intervention: Expressed Empathy/Understanding, Supported Autonomy, Collaboration, Evocation and Open-ended questions     Change Talk Expressed by the Patient: Desire to change    Provider Response to Change Talk: E - Evoked more info from patient about behavior change, A - Affirmed patient's thoughts, decisions, or attempts at behavior change, R - Reflected patient's change talk and S - Summarized patient's change talk statements     DBT: psycho-education distress tolerance skills, mindfulness skills   CBT: challenging cognitive distortion        ASSESSMENT: Current Emotional / Mental Status (status of significant symptoms):   Risk status (Self / Other harm or suicidal ideation)   Patient denies current fears or concerns for personal safety.   Patient denies current or recent suicidal ideation or behaviors.   Patient denies current or recent homicidal ideation or " "behaviors.   Patient denies current or recent self injurious behavior or ideation.   Patient denies other safety concerns.   Patient reports there has been no change in risk factors since their last session.     Patient reports there has been no change in protective factors since their last session.     Recommended that patient call 911 or go to the local ED should there be a change in any of these risk factors.     Appearance:   unable to assess due to telephone visit    Eye Contact:   unable to assess    Psychomotor Behavior: unable to assess    Attitude:   Interested Friendly   Orientation:   All   Speech    Rate / Production: Normal     Volume:  Normal    Mood:    Normal   Affect:    unable to assess    Thought Content:  Clear    Thought Form:  Coherent  Logical    Insight:    Fair      Medication Review:   No current psychiatric medications prescribed     Medication Compliance:   NA     Changes in Health Issues:   None reported     Chemical Use Review:   Substance Use: Chemical use reviewed, no active concerns identified      Tobacco Use: Patient \"vapes\" and reported that she would like to address her concerns during treatment.    Diagnosis:  1. Generalized anxiety disorder        Collateral Reports Completed:   Not Applicable    PLAN: (Patient Tasks / Therapist Tasks / Other)  Patient will abstain from using nicotine and will use 3 coping skills to manage her urges to use nicotine daily.        Angie Beach [facility]                                                                                                      Treatment Plan    Client's Name: Elizabeth Bingham  YOB: 2000    Date: 11/10/2020    DSM-V Diagnoses: 300.02 (F41.1) Generalized Anxiety Disorder  Psychosocial / Contextual Factors: COVID  WHODAS:   WHODAS 2.0 Total Score 10/28/2020   Total Score 14       Referral / Collaboration:  Referral to another professional/service is not indicated at this time..    Anticipated number of session " or this episode of care: 30      MeasurableTreatment Goal(s) related to diagnosis / functional impairment(s)  Goal 1: Client will reduce her anxiety as evidenced by a reduction 2 points on the DANIEL-7.     I will know I've met my goal when my bank account looks better, not walking around everywhere with a vape in hand, not biting my lip.      Objective #A (Client Action)    Client will build insight and awareness to substance use triggers and urges.  Status: New - Date: 11/10/2020     Intervention(s)  Therapist will teach about the cycle of addiction, help identify triggers and urges, assist with problem solving barriers to reduce use, explore     Objective #B  Client will learn and practice coping skills to reduce anxiety daily.  Status: New - Date: 11/10/2020     Intervention(s)  Therapist will provide psycho-education on distress tolerance skills and mindfulness, assist with problem solving barriers to effective skill use, and offer skills coaching and skill identification.  .    Objective #C  Client will process and explore symptoms of anxiety for more effective intervention.  Status: New - Date: 11/10/2020     Intervention(s)  Therapist will provide psycho-education on the cycle of anxiety, assist with emotion identification, offer empathy and validation, and create a safe and supportive environment.              Patient has reviewed and agreed to the above plan.      Angie Beach, MARNIE  November 10, 2020

## 2020-12-21 ENCOUNTER — VIRTUAL VISIT (OUTPATIENT)
Dept: PSYCHOLOGY | Facility: CLINIC | Age: 20
End: 2020-12-21
Payer: COMMERCIAL

## 2020-12-21 DIAGNOSIS — F41.1 GENERALIZED ANXIETY DISORDER: Primary | ICD-10-CM

## 2020-12-21 PROCEDURE — 90834 PSYTX W PT 45 MINUTES: CPT | Mod: 95 | Performed by: COUNSELOR

## 2020-12-21 ASSESSMENT — ANXIETY QUESTIONNAIRES
1. FEELING NERVOUS, ANXIOUS, OR ON EDGE: NOT AT ALL
5. BEING SO RESTLESS THAT IT IS HARD TO SIT STILL: NOT AT ALL
6. BECOMING EASILY ANNOYED OR IRRITABLE: NOT AT ALL
GAD7 TOTAL SCORE: 0
IF YOU CHECKED OFF ANY PROBLEMS ON THIS QUESTIONNAIRE, HOW DIFFICULT HAVE THESE PROBLEMS MADE IT FOR YOU TO DO YOUR WORK, TAKE CARE OF THINGS AT HOME, OR GET ALONG WITH OTHER PEOPLE: NOT DIFFICULT AT ALL
7. FEELING AFRAID AS IF SOMETHING AWFUL MIGHT HAPPEN: NOT AT ALL
3. WORRYING TOO MUCH ABOUT DIFFERENT THINGS: NOT AT ALL
2. NOT BEING ABLE TO STOP OR CONTROL WORRYING: NOT AT ALL

## 2020-12-21 ASSESSMENT — PATIENT HEALTH QUESTIONNAIRE - PHQ9
5. POOR APPETITE OR OVEREATING: NOT AT ALL
SUM OF ALL RESPONSES TO PHQ QUESTIONS 1-9: 1

## 2020-12-21 NOTE — PROGRESS NOTES
"                                           Progress Note    Patient Name: Elizabeth Bingham  Date: 12/21/2020         Service Type: Individual      Session Start Time: 1100  Session End Time: 1148     Session Length: 48 minutes    Session #: 5    Attendees: Client attended alone    Service Modality:  Phone Visit:    The patient has been notified of the following:      \"We have found that certain health care needs can be provided without the need for a face to face visit.  This service lets us provide the care you need with a phone conversation.       I will have full access to your [place] medical record during this entire phone call.   I will be taking notes for your medical record.      Since this is like an office visit, we will bill your insurance company for this service.       There are potential benefits and risks of telephone visits (e.g. limits to patient confidentiality) that differ from in-person visits.?  Confidentiality still applies for telephone services, and nobody will record the visit.  It is important to be in a quiet, private space that is free of distractions (including cell phone or other devices) during the visit.??      If during the course of the call I believe a telephone visit is not appropriate, you will not be charged for this service\"     Consent has been obtained for this service by care team member: Yes      Treatment Plan Last Reviewed: 11/10/2020  PHQ-9 / DANIEL-7 : 12/21/2020  PHQ 10/28/2020 12/21/2020   PHQ-9 Total Score 3 1   Q9: Thoughts of better off dead/self-harm past 2 weeks Not at all Not at all     DANIEL-7 SCORE 10/28/2020 12/21/2020   Total Score 3 0             DATA  Interactive Complexity: No  Crisis: No       Progress Since Last Session (Related to Symptoms / Goals / Homework):   Symptoms: Improving since last session She reported changes in sleep, fatigue, difficultly concentrating, feeling anxious and nervous, uncontrollable worry, and racing thoughts.    Homework: " "Achieved / completed to satisfaction      Episode of Care Goals: Minimal progress - PREPARATION (Decided to change - considering how); Intervened by negotiating a change plan and determining options / strategies for behavior change, identifying triggers, exploring social supports, and working towards setting a date to begin behavior change     Current / Ongoing Stressors and Concerns:   Current-graduate school, family relationships, anxiety, \"vaping\"    Patient completed the PHQ-9 and the DANIEL-7. Patient took time to process and explore her fears about starting and being in a relationship with a significant other. Patient shared about her relationship history. She noted that she is doing well on reducing her nicotine use. Writer assisted her with challenging and re-framing her cognitive distortions and helped her set short term goals. She was receptive to feedback and support. She denied any suicidal ideation and SIB.    Ongoing-graduate school, family, anxiety, \"vaping\"     Treatment Objective(s) Addressed in This Session:   Client will process and explore symptoms of anxiety for more effective intervention.     Intervention:   Motivational Interviewing    MI Intervention: Expressed Empathy/Understanding, Supported Autonomy, Collaboration, Evocation and Open-ended questions     Change Talk Expressed by the Patient: Desire to change    Provider Response to Change Talk: E - Evoked more info from patient about behavior change, A - Affirmed patient's thoughts, decisions, or attempts at behavior change, R - Reflected patient's change talk and S - Summarized patient's change talk statements     DBT: psycho-education distress tolerance skills, mindfulness skills   CBT: challenging cognitive distortion        ASSESSMENT: Current Emotional / Mental Status (status of significant symptoms):   Risk status (Self / Other harm or suicidal ideation)   Patient denies current fears or concerns for personal safety.   Patient denies " "current or recent suicidal ideation or behaviors.   Patient denies current or recent homicidal ideation or behaviors.   Patient denies current or recent self injurious behavior or ideation.   Patient denies other safety concerns.   Patient reports there has been no change in risk factors since their last session.     Patient reports there has been no change in protective factors since their last session.     Recommended that patient call 911 or go to the local ED should there be a change in any of these risk factors.     Appearance:   unable to assess due to telephone visit    Eye Contact:   unable to assess    Psychomotor Behavior: unable to assess    Attitude:   Cooperative  Friendly   Orientation:   All   Speech    Rate / Production: Normal     Volume:  Normal    Mood:    Anxious    Affect:    unable to assess    Thought Content:  Clear    Thought Form:  Coherent  Logical    Insight:    Fair      Medication Review:   No current psychiatric medications prescribed     Medication Compliance:   NA     Changes in Health Issues:   None reported     Chemical Use Review:   Substance Use: Chemical use reviewed, no active concerns identified      Tobacco Use: Patient \"vapes\" and reported that she would like to address her concerns during treatment.    Diagnosis:  1. Generalized anxiety disorder        Collateral Reports Completed:   Not Applicable    PLAN: (Patient Tasks / Therapist Tasks / Other)  Patient will journal about values she wants in a relationship twice for 15 minutes.        Angie Beach [facility]                                                                                                      Treatment Plan    Client's Name: Elizbaeth Bingham  YOB: 2000    Date: 11/10/2020    DSM-V Diagnoses: 300.02 (F41.1) Generalized Anxiety Disorder  Psychosocial / Contextual Factors: COVID  WHODAS:   WHODAS 2.0 Total Score 10/28/2020   Total Score 14       Referral / Collaboration:  Referral to another " professional/service is not indicated at this time..    Anticipated number of session or this episode of care: 30      MeasurableTreatment Goal(s) related to diagnosis / functional impairment(s)  Goal 1: Client will reduce her anxiety as evidenced by a reduction 2 points on the DANIEL-7.     I will know I've met my goal when my bank account looks better, not walking around everywhere with a vape in hand, not biting my lip.      Objective #A (Client Action)    Client will build insight and awareness to substance use triggers and urges.  Status: New - Date: 11/10/2020     Intervention(s)  Therapist will teach about the cycle of addiction, help identify triggers and urges, assist with problem solving barriers to reduce use, explore     Objective #B  Client will learn and practice coping skills to reduce anxiety daily.  Status: New - Date: 11/10/2020     Intervention(s)  Therapist will provide psycho-education on distress tolerance skills and mindfulness, assist with problem solving barriers to effective skill use, and offer skills coaching and skill identification.  .    Objective #C  Client will process and explore symptoms of anxiety for more effective intervention.  Status: New - Date: 11/10/2020     Intervention(s)  Therapist will provide psycho-education on the cycle of anxiety, assist with emotion identification, offer empathy and validation, and create a safe and supportive environment.              Patient has reviewed and agreed to the above plan.      Angie Beach, [facility]  November 10, 2020

## 2020-12-22 ASSESSMENT — ANXIETY QUESTIONNAIRES: GAD7 TOTAL SCORE: 0

## 2021-12-05 ENCOUNTER — FCC EXTENDED DOCUMENTATION (OUTPATIENT)
Dept: PSYCHOLOGY | Facility: CLINIC | Age: 21
End: 2021-12-05
Payer: COMMERCIAL

## 2021-12-05 NOTE — PROGRESS NOTES
Discharge Summary  Multiple Sessions    Client Name: Elizabeth Bingham MRN#: [id number] YOB: 2000      Intake / Discharge Date: 10/28/2020 12/5/2021      DSM5 Diagnoses: (Sustained by DSM5 Criteria Listed Above)  Diagnoses: 300.02 (F41.1) Generalized Anxiety Disorder  Substance-Related & Addictive Disorders 304.30 (F12.20) Cannabis Use Disorder Moderate  active  Psychosocial & Contextual Factors: COVID, [employer], graduate school  WHODAS 2.0 (12 item) Score:   WHODAS 2.0 Total Score 10/28/2020   Total Score 14               Presenting Concern:  Wanting to quit smoking, anxiety      Reason for Discharge:  Client is satisfied with progress and Client did not return      Disposition at Time of Last Encounter:   Comments:   No contact within the last 90 days       Risk Management:   Client denies a history of suicidal ideation, suicide attempts, self-injurious behavior, homicidal ideation, homicidal behavior and and other safety concerns  Recommended that patient call 911 or go to the local ED should there be a change in any of these risk factors.      Referred To:  May return to [facility] for therapy services          MARNIE Loving   12/5/2021